# Patient Record
Sex: MALE | NOT HISPANIC OR LATINO | URBAN - METROPOLITAN AREA
[De-identification: names, ages, dates, MRNs, and addresses within clinical notes are randomized per-mention and may not be internally consistent; named-entity substitution may affect disease eponyms.]

---

## 2024-08-07 ENCOUNTER — OFFICE (OUTPATIENT)
Dept: URBAN - METROPOLITAN AREA CLINIC 15 | Facility: CLINIC | Age: 74
End: 2024-08-07
Payer: MEDICAID

## 2024-08-07 VITALS
WEIGHT: 135.4 LBS | HEART RATE: 54 BPM | DIASTOLIC BLOOD PRESSURE: 60 MMHG | SYSTOLIC BLOOD PRESSURE: 130 MMHG | HEIGHT: 70 IN | OXYGEN SATURATION: 99 % | TEMPERATURE: 98.4 F

## 2024-08-07 DIAGNOSIS — B18.2 CHRONIC VIRAL HEPATITIS C: ICD-10-CM

## 2024-08-07 PROCEDURE — 99203 OFFICE O/P NEW LOW 30 MIN: CPT | Mod: SA | Performed by: INTERNAL MEDICINE

## 2024-10-17 ENCOUNTER — OFFICE (OUTPATIENT)
Dept: URBAN - METROPOLITAN AREA CLINIC 15 | Facility: CLINIC | Age: 74
End: 2024-10-17
Payer: MEDICAID

## 2024-10-17 VITALS
TEMPERATURE: 98.4 F | WEIGHT: 137 LBS | SYSTOLIC BLOOD PRESSURE: 126 MMHG | HEART RATE: 53 BPM | OXYGEN SATURATION: 98 % | DIASTOLIC BLOOD PRESSURE: 56 MMHG | HEIGHT: 70 IN

## 2024-10-17 DIAGNOSIS — B18.2 CHRONIC VIRAL HEPATITIS C: ICD-10-CM

## 2024-10-17 PROCEDURE — 99211 OFF/OP EST MAY X REQ PHY/QHP: CPT | Performed by: INTERNAL MEDICINE

## 2024-12-16 ENCOUNTER — OFFICE (OUTPATIENT)
Dept: URBAN - METROPOLITAN AREA CLINIC 15 | Facility: CLINIC | Age: 74
End: 2024-12-16
Payer: MEDICAID

## 2024-12-16 VITALS
HEIGHT: 70 IN | HEART RATE: 57 BPM | OXYGEN SATURATION: 99 % | TEMPERATURE: 98.3 F | DIASTOLIC BLOOD PRESSURE: 60 MMHG | SYSTOLIC BLOOD PRESSURE: 122 MMHG | WEIGHT: 135.2 LBS

## 2024-12-16 DIAGNOSIS — B18.2 CHRONIC VIRAL HEPATITIS C: ICD-10-CM

## 2024-12-16 DIAGNOSIS — Z86.0100 PERSONAL HISTORY OF COLON POLYPS, UNSPECIFIED: ICD-10-CM

## 2024-12-16 PROCEDURE — 99214 OFFICE O/P EST MOD 30 MIN: CPT | Mod: SA | Performed by: PHYSICIAN ASSISTANT

## 2024-12-16 NOTE — SERVICEHPINOTES
Pantera Kenney   is seen today for a follow-up visit.     Pt is doing well. Tolerating med. No issues or concerns. Has since been diagnosed w/ kidney Cancer. He is going to [facility] for follow up.